# Patient Record
Sex: FEMALE | Race: WHITE | NOT HISPANIC OR LATINO | Employment: OTHER | URBAN - METROPOLITAN AREA
[De-identification: names, ages, dates, MRNs, and addresses within clinical notes are randomized per-mention and may not be internally consistent; named-entity substitution may affect disease eponyms.]

---

## 2017-08-17 ENCOUNTER — TRANSCRIBE ORDERS (OUTPATIENT)
Dept: OBSTETRICS AND GYNECOLOGY | Facility: CLINIC | Age: 62
End: 2017-08-17

## 2017-08-17 DIAGNOSIS — Z12.31 VISIT FOR SCREENING MAMMOGRAM: Primary | ICD-10-CM

## 2017-09-05 ENCOUNTER — APPOINTMENT (OUTPATIENT)
Dept: MAMMOGRAPHY | Facility: HOSPITAL | Age: 62
End: 2017-09-05
Attending: OBSTETRICS & GYNECOLOGY

## 2017-09-19 ENCOUNTER — APPOINTMENT (OUTPATIENT)
Dept: MAMMOGRAPHY | Facility: HOSPITAL | Age: 62
End: 2017-09-19
Attending: OBSTETRICS & GYNECOLOGY

## 2017-10-12 ENCOUNTER — HOSPITAL ENCOUNTER (OUTPATIENT)
Dept: MAMMOGRAPHY | Facility: HOSPITAL | Age: 62
Discharge: HOME | End: 2017-10-12
Attending: OBSTETRICS & GYNECOLOGY | Admitting: OBSTETRICS & GYNECOLOGY

## 2017-10-12 ENCOUNTER — OFFICE VISIT (OUTPATIENT)
Dept: OBSTETRICS AND GYNECOLOGY | Facility: CLINIC | Age: 62
End: 2017-10-12

## 2017-10-12 VITALS
HEIGHT: 66 IN | SYSTOLIC BLOOD PRESSURE: 118 MMHG | WEIGHT: 193 LBS | BODY MASS INDEX: 31.02 KG/M2 | DIASTOLIC BLOOD PRESSURE: 70 MMHG

## 2017-10-12 DIAGNOSIS — Z01.419 WOMEN'S ANNUAL ROUTINE GYNECOLOGICAL EXAMINATION: Primary | ICD-10-CM

## 2017-10-12 DIAGNOSIS — Z12.31 VISIT FOR SCREENING MAMMOGRAM: ICD-10-CM

## 2017-10-12 DIAGNOSIS — N81.6 RECTOCELE: ICD-10-CM

## 2017-10-12 DIAGNOSIS — K59.00 CONSTIPATION, UNSPECIFIED CONSTIPATION TYPE: ICD-10-CM

## 2017-10-12 PROCEDURE — 99396 PREV VISIT EST AGE 40-64: CPT | Performed by: OBSTETRICS & GYNECOLOGY

## 2017-10-12 PROCEDURE — G0202 SCR MAMMO BI INCL CAD: HCPCS

## 2017-10-12 PROCEDURE — 82272 OCCULT BLD FECES 1-3 TESTS: CPT | Performed by: OBSTETRICS & GYNECOLOGY

## 2017-10-12 PROCEDURE — 77063 BREAST TOMOSYNTHESIS BI: CPT

## 2017-10-12 NOTE — PROGRESS NOTES
"Subjective   Chief Complaint   Patient presents with   • Gynecologic Exam     CHANELL     Nuris Anderson is a 61 y.o. year old  menopausal female presenting to be seen for her annual exam.  There has not been vaginal bleeding in the last 12 months.  Hot flashes and night sweats are not a significant problem. Night sweats or hot flashes in legs at night - once weekly.  She had seen Dr. Josue Álvarez last year.  She is since become a labor rest part-time.  Nuris is doing well.  No medications negative gynecologic history no prior pregnancies.  Medical history is negative.  System review positive only for constipation.  She manages this with diet and coffee.  She also takes an 1800 mg of calcium a day and supplements.  Also gets some in her diet so suggested that she cut back on her calcium supplements.    SEXUAL Hx:  She is sexually active.  Vaginal dryness is not a problem.    HEALTH Hx:  She exercises regularly: yes. 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  She has concerns about domestic violence: no.  She has noticed changes in height: no.              Calcium intake is adequate    The following portions of the patient's history were reviewed and updated as appropriate:problem list, current medications, allergies, past family history, past medical history, past social history and past surgical history.  Only thing of significance is her mother developed osteoporosis in her 70s.  However upon review Nuris had a normal DEXA scan last year and she is about 10-12 years postmenopausal.    Smoking status: Never Smoker                                                              Smokeless status: Never Used                        Review of Systems   Her bowels and bladder are normal     Objective   /70  Ht 65.5\" (166.4 cm)  Wt 193 lb (87.5 kg)  LMP  (LMP Unknown)  BMI 31.63 kg/m2     General:  well developed; well nourished  no acute distress  appears " stated age   Skin:  No suspicious lesions seen   Thyroid: normal to inspection and palpation   Breasts:  Examined in supine position  Symmetric without masses or skin dimpling  Nipples normal without inversion, lesions or discharge  There are no palpable axillary nodes   Abdomen: soft, non-tender; no masses  no umbilical or inginual hernias are present  no hepato-splenomegaly   Pelvis: Clinical staff was present for exam  External genitalia:  normal appearance of the external genitalia including Bartholin's and [place]'s glands.  :  urethral meatus normal;  Vaginal:  atrophic mucosal changes are present;  Cervix:  normal appearance. friable; Spotted with Pap.  Uterus:  normal size, shape and consistency.  Adnexa:  non palpable bilaterally.  Rectal:  anus visually normal appearing. recto-vaginal exam unremarkable and confirms findings; guaiac negative;  Rectocele GRADE 2        Assessment   1. Normal postmenopausal examination with a symptomatic rectocele  2. Some constipation.  May be due to 1800 mg of calcium supplementation.  Since she also gets some in her diet think she can cut back on the supplements.  Her bone this study was normal.  3. She is up-to-date on Pap testing as of today.  4. Mammogram is up-to-date  5. She had a normal DEXA scan last year.  Colonoscopy is up-to-date Hemoccult is negative today     Plan   1. Calcium discussed  1200 mg daily in divided doses ideally in diet  2. Regular weight bearing exercise  3. Breast self awareness, mammograms discussed  4. Follow-up Pap test.  5. Follow-up rectocele only if symptomatic.    No orders of the defined types were placed in this encounter.          This note was electronically signed.    Keshav Cooper M.D.  October 12, 2017

## 2017-10-13 PROCEDURE — 77063 BREAST TOMOSYNTHESIS BI: CPT | Performed by: RADIOLOGY

## 2017-10-13 PROCEDURE — 77067 SCR MAMMO BI INCL CAD: CPT | Performed by: RADIOLOGY

## 2018-12-13 ENCOUNTER — TRANSCRIBE ORDERS (OUTPATIENT)
Dept: ADMINISTRATIVE | Facility: HOSPITAL | Age: 63
End: 2018-12-13

## 2018-12-13 DIAGNOSIS — Z12.31 VISIT FOR SCREENING MAMMOGRAM: Primary | ICD-10-CM

## 2019-01-30 ENCOUNTER — APPOINTMENT (OUTPATIENT)
Dept: MAMMOGRAPHY | Facility: HOSPITAL | Age: 64
End: 2019-01-30
Attending: OBSTETRICS & GYNECOLOGY

## 2019-01-31 ENCOUNTER — APPOINTMENT (OUTPATIENT)
Dept: MAMMOGRAPHY | Facility: HOSPITAL | Age: 64
End: 2019-01-31
Attending: OBSTETRICS & GYNECOLOGY

## 2019-02-05 ENCOUNTER — OFFICE VISIT (OUTPATIENT)
Dept: OBSTETRICS AND GYNECOLOGY | Facility: CLINIC | Age: 64
End: 2019-02-05

## 2019-02-05 ENCOUNTER — HOSPITAL ENCOUNTER (OUTPATIENT)
Dept: MAMMOGRAPHY | Facility: HOSPITAL | Age: 64
Discharge: HOME | End: 2019-02-05
Attending: OBSTETRICS & GYNECOLOGY | Admitting: OBSTETRICS & GYNECOLOGY

## 2019-02-05 VITALS
BODY MASS INDEX: 31.18 KG/M2 | DIASTOLIC BLOOD PRESSURE: 70 MMHG | WEIGHT: 194 LBS | SYSTOLIC BLOOD PRESSURE: 118 MMHG | HEIGHT: 66 IN

## 2019-02-05 DIAGNOSIS — N81.6 RECTOCELE: ICD-10-CM

## 2019-02-05 DIAGNOSIS — Z01.419 WOMEN'S ANNUAL ROUTINE GYNECOLOGICAL EXAMINATION: ICD-10-CM

## 2019-02-05 DIAGNOSIS — Z12.31 VISIT FOR SCREENING MAMMOGRAM: ICD-10-CM

## 2019-02-05 DIAGNOSIS — M85.852 OSTEOPENIA OF NECK OF LEFT FEMUR: Primary | ICD-10-CM

## 2019-02-05 PROCEDURE — 99396 PREV VISIT EST AGE 40-64: CPT | Performed by: OBSTETRICS & GYNECOLOGY

## 2019-02-05 PROCEDURE — 82272 OCCULT BLD FECES 1-3 TESTS: CPT | Performed by: OBSTETRICS & GYNECOLOGY

## 2019-02-05 PROCEDURE — 77067 SCR MAMMO BI INCL CAD: CPT

## 2019-02-05 PROCEDURE — 77067 SCR MAMMO BI INCL CAD: CPT | Performed by: RADIOLOGY

## 2019-02-05 PROCEDURE — 77063 BREAST TOMOSYNTHESIS BI: CPT

## 2019-02-05 PROCEDURE — 77063 BREAST TOMOSYNTHESIS BI: CPT | Performed by: RADIOLOGY

## 2019-02-05 NOTE — PROGRESS NOTES
Subjective   Chief Complaint   Patient presents with   • Annual Exam     pelvic pressure with vag discharge for 2 days, gone away     Nuris Anderson is a 63 y.o. year old  menopausal female presenting to be seen for her annual exam.  There has not been vaginal bleeding in the last 12 months.  Hot flashes and night sweats are not a significant problem.  Discussed that I reviewed her old records.  Mammogram is up-to-date and she had one today.  Results are still pending.  She had DEXA scan  showed osteopenia normal neck however in  was not osteopenic.  Suggest she may be follows up 5 years from then were about .  She currently is on adequate calcium and stays active managing and only immotile.  She had to cancel appointment last week because of weather.  Discussed lab work she has not seen Dr. Maldonado in a few years and would like to get some lab work done today.  Had episode of pelvic pain pressure last about 2 days or so was not constipated bladder was working fine wonders what was going on or what might be going on.  She does have a history of rectocele but that had not really been very symptomatic in the past.    SEXUAL Hx:  She is sexually active.  Vaginal dryness is not a problem.  [place] is painful:no  She has concerns about domestic violence: no    HEALTH Hx:  She exercises regularly: yes.active during day at work [employer]  She wears her seat belt:yes.  Self breast awareness: yes  She has noticed changes in height: no              Calcium intake is adequate 1200 mg daily              Caffeine intake: 2 equivalent cups of coffee    The following portions of the patient's history were reviewed and updated as appropriate:problem list, current medications, allergies, past family history, past medical history, past social history and past surgical history.  [unfilled]    Social History    Tobacco Use      Smoking status: Never Smoker      Smokeless tobacco: Never Used    Alcohol :   "does not drink    Review of Systems   Her bowels and bladder are normal; constipation improved ; depends on food     Objective   /70   Ht 167 cm (65.75\")   Wt 88 kg (194 lb)   BMI 31.55 kg/m²      General:  well developed; well nourished  no acute distress  appears stated age   Skin:  No suspicious lesions seen   Thyroid: not examined   Breasts:  Examined in supine position  Symmetric without masses or skin dimpling  Nipples normal without inversion, lesions or discharge  There are no palpable axillary nodes   Abdomen: soft, non-tender; no masses  no umbilical or inguinal hernias are present  no hepato-splenomegaly   Pelvis: Clinical staff was present for exam  External genitalia:  normal appearance of the external genitalia including Bartholin's and [place]'s glands.  :  urethral meatus normal;  Vaginal:  atrophic mucosal changes are present;  Uterus:  normal size, shape and consistency. anteverted;  Adnexa:  non palpable bilaterally.  Rectal:  anus visually normal appearing. recto-vaginal exam unremarkable and confirms findings; good sphincter tone; no masses; guaiac negative; Grade 1-2 rectocele       Lab Review   Pap test  Imaging review  DEXA  Mammogram report       Assessment   1. Normal postmenopausal examination with a relatively asymptomatic rectocele.  Episode of pressure which has resolved.  Told him not sure what caused this if it was bladder or bowels or ovarian mass which I do not feel today at all and she is nontender.  Could have been bowel spasm?  2. Up-to-date on mammograms and Pap smear will be due next year may be her last one.  3. She would like to have some lab work done today so we will order TSH, vitamin D, cholesterol and HDL       Plan   1. Calcium discussed  1200 mg daily in divided doses ideally in diet  2. Regular weight bearing exercise; DEXA in ~ 2021  3. Breast self awareness, mammograms discussed  4. Annual or sooner as needed; Pap in 1 year may be last Pap    No orders of " the defined types were placed in this encounter.          This note was electronically signed.    Keshav Cooper M.D.  February 5, 2019

## 2019-12-03 ENCOUNTER — OFFICE VISIT (OUTPATIENT)
Dept: PULMONOLOGY | Facility: CLINIC | Age: 64
End: 2019-12-03

## 2019-12-03 VITALS
BODY MASS INDEX: 30.95 KG/M2 | HEART RATE: 67 BPM | SYSTOLIC BLOOD PRESSURE: 120 MMHG | TEMPERATURE: 97.9 F | WEIGHT: 192.6 LBS | OXYGEN SATURATION: 98 % | DIASTOLIC BLOOD PRESSURE: 78 MMHG | HEIGHT: 66 IN

## 2019-12-03 DIAGNOSIS — Z83.6 FAMILY HISTORY OF PULMONARY FIBROSIS: Primary | ICD-10-CM

## 2019-12-03 PROCEDURE — 94726 PLETHYSMOGRAPHY LUNG VOLUMES: CPT | Performed by: NURSE PRACTITIONER

## 2019-12-03 PROCEDURE — 99203 OFFICE O/P NEW LOW 30 MIN: CPT | Performed by: NURSE PRACTITIONER

## 2019-12-03 PROCEDURE — 94729 DIFFUSING CAPACITY: CPT | Performed by: NURSE PRACTITIONER

## 2019-12-03 PROCEDURE — 94375 RESPIRATORY FLOW VOLUME LOOP: CPT | Performed by: NURSE PRACTITIONER

## 2019-12-03 NOTE — PROGRESS NOTES
[facility] Pulmonary Evaluation    CHIEF COMPLAINT    Family history of pulmonary fibrosis    Refered by:  Marlo Maldonado MD        HISTORY OF PRESENT ILLNESS    Nuris Anderson is a 64 y.o.female here today for pulmonary evaluation for a family history of interstitial lung disease.    She has a father and brother who has interstitial lung disease.  We see her brother in the office, Zachariah Hema.  On his prompting she came to get evaluated and testing done.    She denies any shortness of breath.  She has no cough or sputum production.  Her  is here as well and denies any chronic cough or dyspnea.  She is very active.  She has occasional sinus and allergy problems.    She is never been told she had an abnormal chest x-ray or CT scan.    She is a non-smoker.  No significant occupational exposures.    She denies any reflux disease.        Patient Active Problem List   Diagnosis   • 59yo    • Osteopenia of neck of left femur / normal    • Rectocele       No Known Allergies    Current Outpatient Medications:   •  Calcium Carbonate-Vit D-Min (CALCIUM 1200 PO), Take  by mouth., Disp: , Rfl:   •  Omega-3 Fatty Acids (FISH OIL PO), Take  by mouth., Disp: , Rfl:     MEDICATION LIST AND ALLERGIES REVIEWED.    Social History     Tobacco Use   • Smoking status: Never Smoker   • Smokeless tobacco: Never Used   Substance Use Topics   • Alcohol use: No   • Drug use: No       FAMILY AND SOCIAL HISTORY REVIEWED AND UPDATED IN EPIC    Review of Systems   Constitutional: Negative for chills, fatigue, fever and unexpected weight change.   HENT: Negative for congestion, nosebleeds, postnasal drip, rhinorrhea, sinus pressure and trouble swallowing.    Respiratory: Negative for cough, chest tightness, shortness of breath and wheezing.    Cardiovascular: Negative for chest pain and leg swelling.   Gastrointestinal: Negative for abdominal pain, constipation, diarrhea, nausea and vomiting.   Genitourinary: Negative for  "dysuria, frequency, hematuria and urgency.   Musculoskeletal: Negative for myalgias.   Neurological: Negative for dizziness, weakness, numbness and headaches.   All other systems reviewed and are negative.  .    /78 (BP Location: Left arm, Patient Position: Sitting)   Pulse 67   Temp 97.9 °F (36.6 °C)   Ht 167.6 cm (66\")   Wt 87.4 kg (192 lb 9.6 oz)   SpO2 98% Comment: resting at room air  BMI 31.09 kg/m²   Immunization History   Administered Date(s) Administered   • Influenza, Unspecified 10/01/2019       Physical Exam   Constitutional: She is oriented to person, place, and time. She appears well-developed and well-nourished.   HENT:   Head: Normocephalic and atraumatic.   Eyes: EOM are normal. Pupils are equal, round, and reactive to light.   Neck: Normal range of motion. Neck supple.   Cardiovascular: Normal rate and regular rhythm.   No murmur heard.  Pulmonary/Chest: Effort normal and breath sounds normal. No respiratory distress. She has no wheezes. She has no rales.   Abdominal: Soft. Bowel sounds are normal. She exhibits no distension.   Musculoskeletal: Normal range of motion. She exhibits no edema.   Neurological: She is alert and oriented to person, place, and time.   Skin: Skin is warm and dry. No erythema.   Psychiatric: She has a normal mood and affect. Her behavior is normal.   Vitals reviewed.      RESULTS    PFTs done in the office today, and read by me:  No obstruction or restriction.  FVC is 2.93 L, 85% predicted  Slightly decreased total lung capacity of 4.16, 79%  Normal DLCO, 88%    PA/LAT CXR done [place] on November 12 showed no significant pulmonary parenchymal abnormalities      PROBLEM LIST    Problem List Items Addressed This Visit     None      Visit Diagnoses     Family history of pulmonary fibrosis    -  Primary    Relevant Orders    Pulmonary Function Test (Completed)            DISCUSSION    We went over her testing today in the office.  Overall her PFTs are normal.  " I would like for her to come in in 6 months to repeat them since her total lung capacity is slightly decreased to 79%.  Her chest x-rays has no evidence of any interstitial changes.  She is largely asymptomatic.  She has no rales on exam.    Given her family history it would be prudent to continue with follow-up with pulmonary function testing the chest x-rays about every year or 2 or sooner depending on symptoms.    She is agreeable to the above follow-up.    I did give her information on a familial interstitial lung disease study that is ongoing.  In case her and her family would like to participate.    Lita Messina, APRN  12/03/20192:16 PM  Electronically signed     Please note that portions of this note were completed with a voice recognition program. Efforts were made to edit the dictations, but occasionally words are mistranscribed.      CC: Marlo Maldonado MD

## 2020-02-05 ENCOUNTER — TRANSCRIBE ORDERS (OUTPATIENT)
Dept: ADMINISTRATIVE | Facility: HOSPITAL | Age: 65
End: 2020-02-05

## 2020-02-05 DIAGNOSIS — Z12.31 VISIT FOR SCREENING MAMMOGRAM: Primary | ICD-10-CM

## 2020-02-06 ENCOUNTER — HOSPITAL ENCOUNTER (OUTPATIENT)
Dept: MAMMOGRAPHY | Facility: HOSPITAL | Age: 65
Discharge: HOME | End: 2020-02-06
Admitting: OBSTETRICS & GYNECOLOGY

## 2020-02-06 ENCOUNTER — OFFICE VISIT (OUTPATIENT)
Dept: OBSTETRICS AND GYNECOLOGY | Facility: CLINIC | Age: 65
End: 2020-02-06

## 2020-02-06 VITALS
BODY MASS INDEX: 30.7 KG/M2 | HEIGHT: 66 IN | SYSTOLIC BLOOD PRESSURE: 122 MMHG | DIASTOLIC BLOOD PRESSURE: 70 MMHG | WEIGHT: 191 LBS

## 2020-02-06 DIAGNOSIS — N81.6 RECTOCELE: ICD-10-CM

## 2020-02-06 DIAGNOSIS — Z01.411 ENCOUNTER FOR GYNECOLOGICAL EXAMINATION WITH ABNORMAL FINDING: Primary | ICD-10-CM

## 2020-02-06 DIAGNOSIS — Z12.31 VISIT FOR SCREENING MAMMOGRAM: ICD-10-CM

## 2020-02-06 PROCEDURE — G0101 CA SCREEN;PELVIC/BREAST EXAM: HCPCS | Performed by: OBSTETRICS & GYNECOLOGY

## 2020-02-06 PROCEDURE — 77067 SCR MAMMO BI INCL CAD: CPT

## 2020-02-06 PROCEDURE — 77067 SCR MAMMO BI INCL CAD: CPT | Performed by: RADIOLOGY

## 2020-02-06 PROCEDURE — 77063 BREAST TOMOSYNTHESIS BI: CPT | Performed by: RADIOLOGY

## 2020-02-06 PROCEDURE — 77063 BREAST TOMOSYNTHESIS BI: CPT

## 2020-02-06 NOTE — PROGRESS NOTES
Subjective   Chief Complaint   Patient presents with   • Annual Exam     Nuris Anderson is a 64 y.o. year old  menopausal female presenting to be seen for her annual exam.  There has not been vaginal bleeding in the last 12 months.  Hot flashes and night sweats are not a significant problem.  Nuris is a annual examinations covered this year.  She is about 2 and half years since her Pap smear.  I think it be good to get a Pap test today and this may be her final Pap smear.  Mammogram is scheduled for today  She had a colonoscopy at [facility] and then see [facility] so maybe that is Dr. Navarrete or Brian and they will contact her.  I will also get a report just for documentation  She had a DEXA in  showed osteopenia and that was normal in 2016 so would continue on calcium and exercise or activities.    SEXUAL Hx:  She is sexually active.  Vaginal dryness is not a problem.  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  Self breast awareness: yes  She has noticed changes in height: no              Calcium intake is adequate 1200 mg  daily              Caffeine intake: caffeine use is moderate-to-high daily    The following portions of the patient's history were reviewed and updated as appropriate:problem list, current medications, allergies, past family history, past medical history, past social history and past surgical history.    Current Outpatient Medications:   •  Calcium Carbonate-Vit D-Min (CALCIUM 1200 PO), Take  by mouth., Disp: , Rfl:   •  Omega-3 Fatty Acids (FISH OIL PO), Take  by mouth., Disp: , Rfl:     Social History    Tobacco Use      Smoking status: Never Smoker      Smokeless tobacco: Never Used    Social History     Substance and Sexual Activity   Alcohol Use No       Review of systems  Constitutional   POS nothing reported                           NEG  "fevers, malaise and night sweats  Breast               POS nothing reported                           NEG persistent breast lump, skin dimpling or nipple discharge  GI                     POS nothing reported                           NEG bloating, change in bowel habits, melena or reflux symptoms                      POS nothing reported                           NEG dysuria, frequency or hematuria         Objective   /70   Ht 166.4 cm (65.5\")   Wt 86.6 kg (191 lb)   Breastfeeding No   BMI 31.30 kg/m²      General:  well developed; well nourished  no acute distress  appears stated age   Skin:  No suspicious lesions seen   Thyroid: not examined   Breasts:  Examined in supine position  Symmetric without masses or skin dimpling  Nipples normal without inversion, lesions or discharge  Fibrocystic changes are present both breasts without a discrete mass   Abdomen: soft, non-tender; no masses  no umbilical or inguinal hernias are present  no hepato-splenomegaly   Pelvis: Clinical staff was present for exam  External genitalia:  normal appearance of the external genitalia including Bartholin's and [place]'s glands.  :  urethral meatus normal; urethral hypermobility is absent.  Vaginal:  atrophic mucosal changes are present;  Cervix:  Pap obtained irregular  Uterus:  normal size, shape and consistency.  Adnexa:  non palpable bilaterally.  Rectal:  digital rectal exam not performed; anus visually normal appearing. Firm stool in rectum slight rectocele       Lab Review   CBC, CMP, LIPIDS and TSH  Imaging review  Mammogram report       Assessment   1. Postmenopausal examination with;  2. Asymptomatic rectocele  3. History of osteopenia which resolved       Plan   1. Annual or sooner as needed  2. Calcium discussed  1200 mg daily in divided doses ideally in diet  3. Regular weight bearing exercise  4. Breast self awareness, mammograms discussed  5. Colonoscopy had been done at [facility] in the past and they will " contact her in the future  6. I think we can wait on DEXA scans since it was normal in 2016. She will call next year to schedule DEXA    No orders of the defined types were placed in this encounter.     No orders of the defined types were placed in this encounter.             This note was electronically signed.    Keshav Cooper M.D.  February 6, 2020

## 2020-02-18 ENCOUNTER — TELEPHONE (OUTPATIENT)
Dept: OBSTETRICS AND GYNECOLOGY | Facility: CLINIC | Age: 65
End: 2020-02-18

## 2020-02-18 NOTE — TELEPHONE ENCOUNTER
PT CALLED SHE WAS TO FIND OUT WHAT HER BROTHER HAD AND SHE WAS TO LET DR DEUTSCH KNOW   IDIOPATHIC PULMONARY FIBROSIS- SHE RECENTLY HAD THE TEST DONE WITH MARSHAL MANDUJANO (PA) HER RESULTS WERE NORMAL AND SHE IS GOING TO CALL [facility] FOR HER COLONOSCOPY[Y RESULTS AND HAVE THEM FAX IT IF THEY CAN FIN OUT WHEN SHE WAS THERE

## 2020-09-09 ENCOUNTER — OFFICE VISIT (OUTPATIENT)
Dept: PULMONOLOGY | Facility: CLINIC | Age: 65
End: 2020-09-09

## 2020-09-09 VITALS
HEART RATE: 76 BPM | TEMPERATURE: 99 F | BODY MASS INDEX: 30.79 KG/M2 | SYSTOLIC BLOOD PRESSURE: 124 MMHG | OXYGEN SATURATION: 97 % | WEIGHT: 191.6 LBS | HEIGHT: 66 IN | DIASTOLIC BLOOD PRESSURE: 76 MMHG

## 2020-09-09 DIAGNOSIS — Z83.6 FAMILY HISTORY OF INTERSTITIAL LUNG DISEASE: Primary | ICD-10-CM

## 2020-09-09 PROCEDURE — 99213 OFFICE O/P EST LOW 20 MIN: CPT | Performed by: NURSE PRACTITIONER

## 2020-09-09 NOTE — PROGRESS NOTES
[facility] Pulmonary Follow up    CHIEF COMPLAINT    Family history of interstitial lung disease    Subjective   HISTORY OF PRESENT ILLNESS    Nuris Anderson is a 64 y.o.female here today for follow-up after her initial visit in December 2019 for a family history of pulmonary fibrosis, I also follow her brother who has IPF and is underwent lung transplant at [facility].  She did have pulmonary function test then that showed a normal FVC but a slightly decreased total lung capacity at 79%.  Her chest x-ray showed no interstitial changes.    She continues to do quite well.  She has no shortness of breath, dyspnea on activity, or cough.  She remains very active.  Her and her  own a hotel.            Patient Active Problem List   Diagnosis   • Osteopenia of neck of left femur 2014/ normal 2016   • Rectocele       No Known Allergies    Current Outpatient Medications:   •  Calcium Carbonate-Vit D-Min (CALCIUM 1200 PO), Take  by mouth., Disp: , Rfl:   •  Omega-3 Fatty Acids (FISH OIL PO), Take  by mouth., Disp: , Rfl:   MEDICATION LIST AND ALLERGIES REVIEWED.    Social History     Tobacco Use   • Smoking status: Never Smoker   • Smokeless tobacco: Never Used   Substance Use Topics   • Alcohol use: No   • Drug use: No       FAMILY AND SOCIAL HISTORY REVIEWED.    Review of Systems   Constitutional: Negative for chills, fatigue, fever and unexpected weight change.   HENT: Negative for congestion, nosebleeds, postnasal drip, rhinorrhea, sinus pressure and trouble swallowing.    Respiratory: Negative for cough, chest tightness, shortness of breath and wheezing.    Cardiovascular: Negative for chest pain and leg swelling.   Gastrointestinal: Negative for abdominal pain, constipation, diarrhea, nausea and vomiting.   Genitourinary: Negative for dysuria, frequency, hematuria and urgency.   Musculoskeletal: Negative for myalgias.   Neurological: Negative for dizziness, weakness, numbness and headaches.   All  "other systems reviewed and are negative.  .  Objective   /76   Pulse 76   Temp 99 °F (37.2 °C)   Ht 166.4 cm (65.5\")   Wt 86.9 kg (191 lb 9.6 oz)   LMP  (LMP Unknown)   SpO2 97% Comment: resting, room air  Breastfeeding No   BMI 31.40 kg/m²     Immunization History   Administered Date(s) Administered   • Hepatitis A 12/04/2018   • Influenza, Unspecified 10/01/2019   • Td 03/25/1997       Physical Exam   Constitutional: She is oriented to person, place, and time. She appears well-developed and well-nourished.   HENT:   Head: Normocephalic and atraumatic.   Eyes: Pupils are equal, round, and reactive to light. EOM are normal.   Neck: Normal range of motion. Neck supple.   Cardiovascular: Normal rate and regular rhythm.   No murmur heard.  Pulmonary/Chest: Effort normal and breath sounds normal. No respiratory distress. She has no wheezes. She has no rales.   Abdominal: Soft. Bowel sounds are normal. She exhibits no distension.   Musculoskeletal: Normal range of motion. She exhibits no edema.   Neurological: She is alert and oriented to person, place, and time.   Skin: Skin is warm and dry. No erythema.   Psychiatric: She has a normal mood and affect. Her behavior is normal.   Vitals reviewed.        RESULTS    PFTS in the office today, read by me:  Unable to complete secondary to the COVID epidemic        Assessment/Plan     PROBLEM LIST         ICD-10-CM ICD-9-CM   1. Family history of interstitial lung disease Z83.6 V17.6       Problem List Items Addressed This Visit     None      Visit Diagnoses     Family history of interstitial lung disease    -  Primary            DISCUSSION    She still remains largely asymptomatic.  Secondary to COVID epidemic she is unable to have her full PFTs done in the office today.  We agrees and she continues to be asymptomatic we will hold off and repeat those in 6 months.        I spent 15 minutes face to face with the patient. I spent > 50% percent of this time " counseling and discussing evaluation, current status and management.    Lita Messina, RHIANNON  09/09/20202:13 PM  Electronically signed     Please note that portions of this note were completed with a voice recognition program. Efforts were made to edit the dictations, but occasionally words are mistranscribed.      CC: Marlo Maldonado MD

## 2021-02-09 ENCOUNTER — OFFICE VISIT (OUTPATIENT)
Dept: OBSTETRICS AND GYNECOLOGY | Facility: CLINIC | Age: 66
End: 2021-02-09

## 2021-02-09 VITALS
WEIGHT: 202 LBS | BODY MASS INDEX: 32.47 KG/M2 | HEIGHT: 66 IN | SYSTOLIC BLOOD PRESSURE: 130 MMHG | DIASTOLIC BLOOD PRESSURE: 80 MMHG

## 2021-02-09 DIAGNOSIS — Z01.411 ENCOUNTER FOR GYNECOLOGICAL EXAMINATION WITH ABNORMAL FINDING: Primary | ICD-10-CM

## 2021-02-09 DIAGNOSIS — N81.6 RECTOCELE: ICD-10-CM

## 2021-02-09 DIAGNOSIS — Z12.31 ENCOUNTER FOR SCREENING MAMMOGRAM FOR MALIGNANT NEOPLASM OF BREAST: ICD-10-CM

## 2021-02-09 PROCEDURE — G0101 CA SCREEN;PELVIC/BREAST EXAM: HCPCS | Performed by: OBSTETRICS & GYNECOLOGY

## 2021-02-09 RX ORDER — MULTIPLE VITAMINS W/ MINERALS TAB 9MG-400MCG
1 TAB ORAL DAILY
COMMUNITY

## 2021-02-09 NOTE — PROGRESS NOTES
Subjective   Chief Complaint   Patient presents with   • Osteoporosis     Nuris Anderson is a 65 y.o. year old  menopausal female presenting to be seen for her annual exam.  There has not been vaginal bleeding in the last 12 months.  Hot flashes and night sweats are not a significant problem.  She is doing well.  She had a Pap smear last year that was normal so that may be the last when she has to have done  Has appoint with Dr. Maldonado and I would suggest she get her lab work done there is was not done in 2019 where we ordered it  She had a colonoscopy at [facility]-Dr. Torres but report was not sent to us from the fall we will ask for those results  Will order mammogram last one was 2020    SEXUAL Hx:  She is sexually active.  Vaginal dryness is not a problem.  [place] is painful:no  She has concerns about domestic violence: no  Discussed risk for STD and sexual behavior.    HEALTH Hx:  She exercises regularly: no. Active runs a motel   She wears her seat belt:yes.  Self breast awareness: yes  She has noticed changes in height: no              Calcium intake is adequate 1200 mg  daily              Caffeine intake: caffeine use is moderate-to-high daily              Discussed immunizations, screenings, mental and dental health.    The following portions of the patient's history were reviewed and updated as appropriate:problem list, current medications, allergies, past family history, past medical history, past social history and past surgical history.      Current Outpatient Medications:   •  Calcium Carbonate-Vit D-Min (CALCIUM 1200 PO), Take  by mouth., Disp: , Rfl:   •  multivitamin with minerals tablet tablet, Take 1 tablet by mouth Daily., Disp: , Rfl:   •  Omega-3 Fatty Acids (FISH OIL PO), Take  by mouth., Disp: , Rfl:     Social History    Tobacco Use      Smoking status: Never Smoker      Smokeless tobacco: Never Used    Social History     Substance and Sexual Activity   Alcohol Use No  "    Discussed avoidance of illicit drugs    Review of systems  Constitutional   POS weight gain                           NEG fatigue, malaise and night sweats  Breast               POS nothing reported                           NEG persistent breast lump or nipple discharge  GI                     POS nothing reported                           NEG bloating, change in bowel habits, melena or reflux symptoms                      POS nothing reported                           NEG dysuria, frequency or hematuria           Objective   /80   Ht 167 cm (65.75\")   Wt 91.6 kg (202 lb)   LMP  (LMP Unknown)   Breastfeeding No   BMI 32.85 kg/m²      General:  well developed; well nourished  no acute distress  appears stated age   Skin:  No suspicious lesions seen   Thyroid: not examined   Breasts:  Examined in supine position  Symmetric without masses or skin dimpling  Nipples normal without inversion, lesions or discharge  Fibrocystic changes are present both breasts without a discrete mass   Abdomen: soft, non-tender; no masses  no umbilical or inguinal hernias are present  no hepato-splenomegaly   Pelvis: Clinical staff was present for exam  External genitalia:  normal appearance of the external genitalia including Bartholin's and [place]'s glands.  :  urethral meatus normal;  Vaginal:  atrophic mucosal changes are present; she is able to perform a Kegel contraction upon request;  Cervix:  normal appearance.  Uterus:  normal size, shape and consistency.  Adnexa:  non palpable bilaterally.  Rectal:  digital rectal exam not performed; anus visually normal appearing.       Lab Review   Pap test  Imaging review  DEXA  Mammogram report               Assessment     1. Normal postmenopausal GYN examination with exception of  2.  Occasional stress urine incontinence.  She was able to moderately strong Kegel contraction.  Would suggest that she timed voiding and do Kegel's whenever she coughs laughs or sneezes  3.   " History of osteopenia which is seem to have resolved with bone density study in 2016.       Plan     1. Annual or sooner as needed  2. Calcium discussed  1200 mg daily in divided doses ideally in diet  3. Regular weight bearing exercise, nutrition, injury avoidance and maintaining healthy weight discussed  4. Breast self awareness and mammograms discussed  5. Lab work cholesterol etc. through primary care    No orders of the defined types were placed in this encounter.     Orders Placed This Encounter   Procedures   • Mammo Screening Digital Tomosynthesis Bilateral With CAD     Standing Status:   Future     Standing Expiration Date:   2/9/2022     Order Specific Question:   Reason for Exam:     Answer:   f              This note was electronically signed.    Keshav Cooper M.D.  February 9, 2021     Addendum she had a normal colonoscopy with exception of internal hemorrhoids,  [facility] October 29, 2020

## 2021-02-24 ENCOUNTER — HOSPITAL ENCOUNTER (OUTPATIENT)
Dept: MAMMOGRAPHY | Facility: HOSPITAL | Age: 66
Discharge: HOME | End: 2021-02-24
Admitting: OBSTETRICS & GYNECOLOGY

## 2021-02-24 DIAGNOSIS — Z12.31 ENCOUNTER FOR SCREENING MAMMOGRAM FOR MALIGNANT NEOPLASM OF BREAST: ICD-10-CM

## 2021-02-24 PROCEDURE — 77063 BREAST TOMOSYNTHESIS BI: CPT | Performed by: RADIOLOGY

## 2021-02-24 PROCEDURE — 77067 SCR MAMMO BI INCL CAD: CPT | Performed by: RADIOLOGY

## 2021-02-24 PROCEDURE — 77067 SCR MAMMO BI INCL CAD: CPT

## 2021-02-24 PROCEDURE — 77063 BREAST TOMOSYNTHESIS BI: CPT

## 2022-02-10 ENCOUNTER — TRANSCRIBE ORDERS (OUTPATIENT)
Dept: ADMINISTRATIVE | Facility: HOSPITAL | Age: 67
End: 2022-02-10

## 2022-02-10 ENCOUNTER — OFFICE VISIT (OUTPATIENT)
Dept: OBSTETRICS AND GYNECOLOGY | Facility: CLINIC | Age: 67
End: 2022-02-10

## 2022-02-10 VITALS
SYSTOLIC BLOOD PRESSURE: 128 MMHG | DIASTOLIC BLOOD PRESSURE: 80 MMHG | WEIGHT: 204 LBS | BODY MASS INDEX: 33.18 KG/M2 | RESPIRATION RATE: 16 BRPM

## 2022-02-10 DIAGNOSIS — N39.3 SUI (STRESS URINARY INCONTINENCE, FEMALE): ICD-10-CM

## 2022-02-10 DIAGNOSIS — Z12.31 ENCOUNTER FOR SCREENING MAMMOGRAM FOR MALIGNANT NEOPLASM OF BREAST: ICD-10-CM

## 2022-02-10 DIAGNOSIS — Z12.31 VISIT FOR SCREENING MAMMOGRAM: Primary | ICD-10-CM

## 2022-02-10 DIAGNOSIS — Z78.0 POSTMENOPAUSAL: ICD-10-CM

## 2022-02-10 DIAGNOSIS — N81.6 RECTOCELE: Primary | ICD-10-CM

## 2022-02-10 PROCEDURE — 99213 OFFICE O/P EST LOW 20 MIN: CPT | Performed by: NURSE PRACTITIONER

## 2022-02-10 RX ORDER — AZELASTINE 1 MG/ML
2 SPRAY, METERED NASAL 2 TIMES DAILY
COMMUNITY
Start: 2021-11-19 | End: 2023-02-16

## 2022-02-10 RX ORDER — ATORVASTATIN CALCIUM 20 MG/1
20 TABLET, FILM COATED ORAL DAILY
COMMUNITY

## 2022-02-10 NOTE — PROGRESS NOTES
Annual Visit     Patient Name: Nuris Anderson  : 1955   MRN: [id number]   Care Team: Patient Care Team:  Marlo Maldonado MD as PCP - General (Family Medicine)    Chief Complaint:    Chief Complaint   Patient presents with   • Menopause       HPI: Nuris Anderson is a 66 y.o. year old  presenting to be seen for annual follow up.   Pap smear  WNL   No hx of abnormal pap results in the last 10 yrs     Mammogram 2021 birads 1     Colonoscopy 10/2020 rpt 5 yrs     DEXA 2016 WNL   Hx osteopenia   Takes daily calcium and vitamin d     Hx melanoma - sees dermatology annually     LOY - stable   Timed voiding and kegels are helpful   It is occasional and not overly bothersome       Subjective      /80   Resp 16   Wt 92.5 kg (204 lb)   LMP  (LMP Unknown)   Breastfeeding No   BMI 33.18 kg/m²     BMI reviewed: Body mass index is 33.18 kg/m².      Objective     Physical Exam    Neuro: alert and oriented to person, place and time   General:  alert; cooperative; well developed; well nourished   Skin:  No suspicious lesions seen   Thyroid: normal to inspection and palpation   Lungs:  breathing is unlabored  clear to auscultation bilaterally   Heart:  regular rate and rhythm, S1, S2 normal, no murmur, click, rub or gallop  normal apical impulse   Breasts:  Examined in supine position  Symmetric without masses or skin dimpling  Nipples normal without inversion, lesions or discharge  There are no palpable axillary nodes   Abdomen: soft, non-tender; no masses  no umbilical or inguinal hernias are present  no hepato-splenomegaly   Pelvis: Clinical staff was present for exam  External genitalia:  normal appearance of the external genitalia including Bartholin's and [place]'s glands.  :  urethral meatus normal;  Vaginal:  atrophic mucosal changes are present;  Cervix:  normal appearance.  Uterus:  not palpable.  Adnexa:  non palpable bilaterally.  Rectal:  digital rectal exam not performed; anus  visually normal appearing. external hemorrhoids present;  Rectocele GRADE 2         Assessment / Plan      Assessment  Problems Addressed This Visit    ICD-10-CM ICD-9-CM   1. Rectocele  N81.6 618.04   2. LOY (stress urinary incontinence, female)  N39.3 625.6   3. Encounter for screening mammogram for malignant neoplasm of breast  Z12.31 V76.12   4. Postmenopausal  Z78.0 V49.81       Plan    Discussed cessation of pap smear screening at age 64   Discussed monthly SBEs and importance of annual imaging   Mammogram ordered     LOY stable   Cont with timed voiding and kegels   Discussed pelvic floor PT but she is not interested at this time     DEXA ordered   Discussed Calcium, 600 mg/ Vit. D, 400 IU daily    Discussed rectocele   States she does sometimes have to apply pressure to posterior vaginal wall to have a BM   This is not often though and is not overly bothersome   Will continue to monitor annually and she will call with questions/concerns before next yr     AV 1 yr             Follow Up  Return in about 1 year (around 2/10/2023) for Annual physical.  Patient was given instructions and counseling regarding her condition or for health maintenance advice. Please see specific information pulled into the AVS if appropriate.     Nell Yoder, APRN  February 10, 2022  13:36 EST

## 2022-05-17 ENCOUNTER — HOSPITAL ENCOUNTER (OUTPATIENT)
Dept: MAMMOGRAPHY | Facility: HOSPITAL | Age: 67
Discharge: HOME | End: 2022-05-17

## 2022-05-17 ENCOUNTER — HOSPITAL ENCOUNTER (OUTPATIENT)
Dept: BONE DENSITY | Facility: HOSPITAL | Age: 67
Discharge: HOME | End: 2022-05-17

## 2022-05-17 DIAGNOSIS — Z12.31 ENCOUNTER FOR SCREENING MAMMOGRAM FOR MALIGNANT NEOPLASM OF BREAST: ICD-10-CM

## 2022-05-17 DIAGNOSIS — Z78.0 POSTMENOPAUSAL: ICD-10-CM

## 2022-05-17 PROCEDURE — 77080 DXA BONE DENSITY AXIAL: CPT

## 2022-05-17 PROCEDURE — 77067 SCR MAMMO BI INCL CAD: CPT | Performed by: RADIOLOGY

## 2022-05-17 PROCEDURE — 77063 BREAST TOMOSYNTHESIS BI: CPT | Performed by: RADIOLOGY

## 2022-05-17 PROCEDURE — 77063 BREAST TOMOSYNTHESIS BI: CPT

## 2022-05-17 PROCEDURE — 77067 SCR MAMMO BI INCL CAD: CPT

## 2023-02-16 ENCOUNTER — OFFICE VISIT (OUTPATIENT)
Dept: OBSTETRICS AND GYNECOLOGY | Facility: CLINIC | Age: 68
End: 2023-02-16

## 2023-02-16 VITALS
SYSTOLIC BLOOD PRESSURE: 124 MMHG | DIASTOLIC BLOOD PRESSURE: 80 MMHG | RESPIRATION RATE: 16 BRPM | BODY MASS INDEX: 32.85 KG/M2 | WEIGHT: 202 LBS

## 2023-02-16 DIAGNOSIS — Z78.0 POSTMENOPAUSAL: ICD-10-CM

## 2023-02-16 DIAGNOSIS — N39.3 SUI (STRESS URINARY INCONTINENCE, FEMALE): ICD-10-CM

## 2023-02-16 DIAGNOSIS — Z01.411 ENCOUNTER FOR GYNECOLOGICAL EXAMINATION WITH ABNORMAL FINDING: Primary | ICD-10-CM

## 2023-02-16 DIAGNOSIS — N81.6 RECTOCELE: ICD-10-CM

## 2023-02-16 PROCEDURE — 99397 PER PM REEVAL EST PAT 65+ YR: CPT | Performed by: NURSE PRACTITIONER

## 2023-02-16 NOTE — PROGRESS NOTES
Annual Visit     Patient Name: Nuris Anderson  : 1955   MRN: [id number]   Care Team: Patient Care Team:  Marlo Maldonado MD as PCP - General (Family Medicine)  Nell Yoder APRN as Nurse Practitioner (Nurse Practitioner)    Chief Complaint:    Chief Complaint   Patient presents with   • Annual Exam       HPI: Nuris Anderson is a 67 y.o. year old  presenting to be seen for her gynecologic exam.   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    /80   Resp 16   Wt 91.6 kg (202 lb)   LMP  (LMP Unknown)   BMI 32.85 kg/m²     BMI reviewed: Body mass index is 32.85 kg/m².      Objective     Physical Exam    Neuro: alert and oriented to person, place and time   General:  alert; cooperative; well developed; well nourished   Skin:  No suspicious lesions seen   Thyroid: normal to inspection and palpation   Lungs:  breathing is unlabored  clear to auscultation bilaterally   Heart:  regular rate and rhythm, S1, S2 normal, no murmur, click, rub or gallop  normal apical impulse   Breasts:  Examined in supine position  Symmetric without masses or skin dimpling  Nipples normal without inversion, lesions or discharge  There are no palpable axillary nodes   Abdomen: soft,  non-tender; no masses  no umbilical or inguinal hernias are present  no hepato-splenomegaly   Pelvis: Clinical staff was present for exam  External genitalia:  normal appearance of the external genitalia including Bartholin's and [place]'s glands.  :  urethral meatus normal;  Vaginal:  atrophic mucosal changes are present;  Cervix:  normal appearance.  Uterus:  normal size, shape and consistency.  Adnexa:  normal bimanual exam of the adnexa.  Rectal:  digital rectal exam not performed; anus visually normal appearing. external hemorrhoids present;  Rectocele GRADE 2         Assessment / Plan      Assessment  Problems Addressed This Visit    ICD-10-CM ICD-9-CM   1. Encounter for gynecological examination with abnormal finding  Z01.411 V72.31   2. Postmenopausal  Z78.0 V49.81   3. Rectocele  N81.6 618.04   4. LOY (stress urinary incontinence, female)  N39.3 625.6       Plan    Discussed cessation of pap smear screening at age 64   Discussed monthly SBEs and importance of annual imaging   Mammogram due May 2023      LOY stable   Cont with timed voiding and kegels      Discussed rectocele stable - sx are stable   Will continue to monitor annually and she will call with questions/concerns before next yr     Discussed calcium and vit d daily for bone health   Reviewed DEXA report      AV 1 yr             Follow Up  Return in about 1 year (around 2/16/2024) for Annual physical.  Patient was given instructions and counseling regarding her condition or for health maintenance advice. Please see specific information pulled into the AVS if appropriate.     Nell Yoder, APRN  February 16, 2023  13:20 EST

## 2023-04-12 ENCOUNTER — TRANSCRIBE ORDERS (OUTPATIENT)
Dept: ADMINISTRATIVE | Facility: HOSPITAL | Age: 68
End: 2023-04-12
Payer: MEDICARE

## 2023-04-12 DIAGNOSIS — Z12.31 VISIT FOR SCREENING MAMMOGRAM: Primary | ICD-10-CM

## 2023-05-22 ENCOUNTER — HOSPITAL ENCOUNTER (OUTPATIENT)
Dept: MAMMOGRAPHY | Facility: HOSPITAL | Age: 68
Discharge: HOME | End: 2023-05-22
Admitting: NURSE PRACTITIONER
Payer: MEDICARE

## 2023-05-22 DIAGNOSIS — Z12.31 VISIT FOR SCREENING MAMMOGRAM: ICD-10-CM

## 2023-05-22 PROCEDURE — 77063 BREAST TOMOSYNTHESIS BI: CPT

## 2023-05-22 PROCEDURE — 77067 SCR MAMMO BI INCL CAD: CPT
